# Patient Record
Sex: MALE | Race: WHITE
[De-identification: names, ages, dates, MRNs, and addresses within clinical notes are randomized per-mention and may not be internally consistent; named-entity substitution may affect disease eponyms.]

---

## 2022-06-06 ENCOUNTER — HOSPITAL ENCOUNTER (EMERGENCY)
Dept: HOSPITAL 97 - ER | Age: 37
Discharge: HOME | End: 2022-06-06
Payer: SELF-PAY

## 2022-06-06 VITALS — OXYGEN SATURATION: 99 % | TEMPERATURE: 98 F

## 2022-06-06 VITALS — DIASTOLIC BLOOD PRESSURE: 67 MMHG | SYSTOLIC BLOOD PRESSURE: 121 MMHG

## 2022-06-06 DIAGNOSIS — J18.9: Primary | ICD-10-CM

## 2022-06-06 DIAGNOSIS — R09.1: ICD-10-CM

## 2022-06-06 DIAGNOSIS — F17.210: ICD-10-CM

## 2022-06-06 LAB
BUN BLD-MCNC: 8 MG/DL (ref 7–18)
GLUCOSE SERPLBLD-MCNC: 154 MG/DL (ref 74–106)
HCT VFR BLD CALC: 41 % (ref 39.6–49)
LYMPHOCYTES # SPEC AUTO: 1.8 K/UL (ref 0.7–4.9)
PMV BLD: 7.3 FL (ref 7.6–11.3)
POTASSIUM SERPL-SCNC: 3.9 MMOL/L (ref 3.5–5.1)
RBC # BLD: 4.43 M/UL (ref 4.33–5.43)
TROPONIN I SERPL HS-MCNC: 3.4 PG/ML (ref ?–58.9)

## 2022-06-06 PROCEDURE — 85379 FIBRIN DEGRADATION QUANT: CPT

## 2022-06-06 PROCEDURE — 85025 COMPLETE CBC W/AUTO DIFF WBC: CPT

## 2022-06-06 PROCEDURE — 99284 EMERGENCY DEPT VISIT MOD MDM: CPT

## 2022-06-06 PROCEDURE — 80048 BASIC METABOLIC PNL TOTAL CA: CPT

## 2022-06-06 PROCEDURE — 71046 X-RAY EXAM CHEST 2 VIEWS: CPT

## 2022-06-06 PROCEDURE — 84484 ASSAY OF TROPONIN QUANT: CPT

## 2022-06-06 PROCEDURE — 93005 ELECTROCARDIOGRAM TRACING: CPT

## 2022-06-06 PROCEDURE — 36415 COLL VENOUS BLD VENIPUNCTURE: CPT

## 2022-06-06 NOTE — EDPHYS
Physician Documentation                                                                           

 [facility]                                                                 

Name: Gian Chowdhury                                                                               

Age: 36 yrs                                                                                       

Sex: Male                                                                                         

: 1985                                                                                   

MRN: [id number]                                                                                   

Arrival Date: 2022                                                                          

Time: 15:18                                                                                       

Account#: [id number]                                                                            

Bed 11                                                                                            

Private MD:                                                                                       

ED Physician Harjinder Agustin                                                                         

HPI:                                                                                              

                                                                                             

15:41 This 36 yrs old Male presents to ER via Ambulatory with complaints of Chest Pain.       rn  

15:41 The patient or guardian reports chest pain that is located primarily in the anterior    rn  

      chest wall, left. The pain does not radiate. Associated signs and symptoms: Pertinent       

      positives: cough, Pertinent negatives: abdominal pain, diaphoresis, dizziness,              

      shortness of breath, syncope, vomiting. The chest pain is described as sharp, stabbing.     

      Duration: The patient or guardian reports multiple episodes, that are intermittent.         

      Modifying factors: The symptoms are alleviated by nothing. the symptoms are aggravated      

      by cough, deep breath. Severity of pain: At its worst the pain was moderate in the          

      emergency department the pain has improved. The patient has not experienced similar         

      symptoms in the past. The patient has not recently seen a physician. Pt reports chest       

      pain, left sided, non-radiating, began 2 days ago, intermittent, no trauma. Reports         

      worse with deep breath and cough. No hemoptysis. + smoker. No hx of dvt. No recent          

      surgery or procedure. .                                                                     

                                                                                                  

Historical:                                                                                       

- Allergies:                                                                                      

15:34 No Known Allergies;                                                                     ww  

- Home Meds:                                                                                      

15:34 suboxone [Active];                                                                      ww  

- PSHx:                                                                                           

15:34 testicle;                                                                               ww  

                                                                                                  

- Immunization history:: Adult Immunizations not up to date.                                      

- Social history:: Smoking status: Patient reports the use of cigarette tobacco                   

  products, smokes one pack cigarettes per day.                                                   

- Family history:: not pertinent.                                                                 

- Hospitalizations: : No recent hospitalization is reported.                                      

                                                                                                  

                                                                                                  

ROS:                                                                                              

15:41 Constitutional: Negative for fever, chills, and weight loss, Eyes: Negative for injury, rn  

      pain, redness, and discharge, Neck: Negative for injury, pain, and swelling,                

      Cardiovascular: + chest pain Respiratory: + cough, + pleuritic chest pain Abdomen/GI:       

      Negative for abdominal pain MS/Extremity: Negative for injury and deformity, Skin:          

      Negative for injury, rash, and discoloration, Neuro: Negative for headache, weakness,       

      numbness, tingling, and seizure.                                                            

                                                                                                  

Exam:                                                                                             

15:41 Constitutional:  This is a well developed, well nourished patient who is awake, alert,  rn  

      and in no acute distress. Smiling and joking. Head/Face:  Normocephalic, atraumatic.        

      Cardiovascular:  Tachycardic, regular Respiratory:  No increased work of breathing, no      

      retractions or nasal flaring. Abdomen/GI:  Soft, non-tender Skin:  Warm, dry MS/            

      Extremity:  Pulses equal, no cyanosis.   Neuro:  Awake and alert, GCS 15                    

                                                                                                  

Vital Signs:                                                                                      

15:33  / 86; Pulse 128; Resp 22; Temp 98.0; Pulse Ox 99% ; Weight 79.38 kg; Height 5    ww  

      ft. 10 in. (177.80 cm);                                                                     

16:13  / 67; Pulse 105; Resp 16; Pulse Ox 99% on R/A;                                   iw  

15:33 Body Mass Index 25.11 (79.38 kg, 177.80 cm)                                             ww  

                                                                                                  

MDM:                                                                                              

15:22 Patient medically screened.                                                             rn  

16:06 ED course: Pt reports smoking and drinking monsters prior to arrival. .                 rn  

16:32 Differential diagnosis: acute pericarditis, anxiety, chest wall pain, costochondritis,  rn  

      pleurisy, pneumonia, pneumothorax, pulmonary embolus. Data reviewed: vital signs,           

      nurses notes, lab test result(s), EKG, radiologic studies, plain films, and as a            

      result, I will discharge patient. Counseling: I had a detailed discussion with the          

      patient and/or guardian regarding: the historical points, exam findings, and any            

      diagnostic results supporting the discharge/admit diagnosis, lab results, radiology         

      results, the need for outpatient follow up, to return to the emergency department if        

      symptoms worsen or persist or if there are any questions or concerns that arise at          

      home. Special discussion: I discussed with the patient/guardian in detail that at this      

      point there is no indication for admission to the hospital. It is understood, however,      

      that if the symptoms persist or worsen the patient needs to return immediately for          

      re-evaluation. ED course: CXR shows early pneumonia, no oxygen requirement, will dc         

      home with return precautions. Pt also admits to smoking crack earlier today prior to        

      coming in. Recommend cessation of crack..                                                   

                                                                                                  

                                                                                             

15:40 Order name: CBC with Diff; Complete Time: 16:15                                         rn  

                                                                                             

15:40 Order name: Basic Metabolic Panel; Complete Time: 17:00                                 rn  

                                                                                             

15:40 Order name: XRAY Chest Pa And Lat (2 Views); Complete Time: 16:25                       rn  

                                                                                             

15:40 Order name: EKG; Complete Time: 15:41                                                   rn  

                                                                                             

15:40 Order name: D-Dimer; Complete Time: 16:15                                               rn  

                                                                                             

15:40 Order name: Troponin High Sensitivity; Complete Time: 17:00                             rn  

                                                                                             

15:40 Order name: IV Start; Complete Time: 15:47                                              rn  

                                                                                             

15:40 Order name: EKG - Nurse/Tech; Complete Time: 15:47                                      rn  

                                                                                             

15:40 Order name: Cardiac monitoring; Complete Time: 16:50                                    rn  

                                                                                             

15:40 Order name: O2 Sat Monitoring; Complete Time: 16:08                                     rn  

                                                                                                  

Administered Medications:                                                                         

No medications were administered                                                                  

                                                                                                  

                                                                                                  

Disposition Summary:                                                                              

22 16:34                                                                                    

Discharge Ordered                                                                                 

      Location: Home                                                                          rn  

      Problem: new                                                                            rn  

      Symptoms: have improved                                                                 rn  

      Condition: Stable                                                                       rn  

      Diagnosis                                                                                   

        - Pneumonia, unspecified organism                                                     rn  

        - Pleurisy                                                                            rn  

      Followup:                                                                               rn  

        - With: Private Physician                                                                  

        - When: As needed                                                                          

        - Reason: Recheck today's complaints, Re-evaluation by your physician                      

      Discharge Instructions:                                                                     

        - Discharge Summary Sheet                                                             rn  

        - Pleurisy                                                                            rn  

        - Community-Acquired Pneumonia, Adult                                                 rn  

      Forms:                                                                                      

        - Medication Reconciliation Form                                                      rn  

        - Thank You Letter                                                                    rn  

        - Antibiotic Education                                                                rn  

        - Prescription Opioid Use                                                             rn  

      Prescriptions:                                                                              

        - levofloxacin 500 mg Oral Tablet                                                          

            - take 1 tablet by ORAL route once daily for 7 days; 7 tablet; Refills: 0,        rn  

      Product Selection Permitted                                                                 

Signatures:                                                                                       

Dispatcher [employer]                           Harjinder Mejia MD MD rn Wood, Whitney, RN RN ww                                                   

                                                                                                  

**************************************************************************************************

## 2022-06-06 NOTE — XMS REPORT
Continuity of Care Document

                             Created on:2022



Patient:KENNY SALGADO

Sex:Male

:1985

External Reference #:[id number]





Demographics







                          Address                   [address] 



                                                    [place] [address]

 

                          Home Phone                [phone]

 

                          Work Phone                [phone]

 

                          Email Address             [email or web address]

 

                          Preferred Language        Unknown

 

                          Marital Status            Unknown

 

                          Cheondoism Affiliation     Unknown

 

                          Race                      Unknown

 

                          Additional Race(s)        Unavailable

 

                          Ethnic Group              Unknown









Author







                          Organization              [facility]

t

 

                          Address                   [address]



                                                    [place] [address]

 

                          Phone                     [phone]









Care Team Providers







                    Name                Role                Phone

 

                    Unavailable         Unavailable         Unavailable









Problems

This patient has no known problems.



Allergies, Adverse Reactions, Alerts

This patient has no known allergies or adverse reactions.



Medications

This patient has no known medications.



Procedures

This patient has no known procedures.



Results







           Test Description Test Time  Test Comments Results    Result Comments 

Source









                    BUPRENORPHINE AND METABOLITE 2022 15:34:27 









                      Test Item  Value      Reference Range Interpretation Comme

nts









             NORBUPRENORPHINE (test code = 6.6 ng/mL                            

  



             30704)                                              

 

             BUPRENORPHINE (test code = 71674) 1.6 ng/mL                        

      INTERPRETIVE INFORMATION:



                                                                 Buprenorphine a

nd Metabolites,



                                                                                

    Serum or Plasma,



                                                                 QuantitativeMet

hodology: Quantitative



                                                                 Liquid Chromato

graphy-Tandem Mass



                                                                 SpectrometryPos

itive cutoff: 1



                                                                 ng/mLFor medica

l purposes only; not



                                                                 valid for foren

sic use.The presence



                                                                 of metabolite(s

) without parent drug



                                                                 is common and m

ay indicate use of



                                                                 parent drug dur

ing the prior week.



                                                                 The absence of 

expected drug(s)



                                                                 and/or drug met

abolite(s) may



                                                                 indicate non-co

mpliance,



                                                                 inappropriate t

iming of specimen



                                                                 collection rela

tive to drug



                                                                 administration,

 poor drug absorption,



                                                                 or limitations 

of testing. The



                                                                 concentration v

alue must be greater



                                                                 than or equal t

o the cutoff to be



                                                                 reported as pos

itive. Interpretive



                                                                 questions shoul

d be directed to the



                                                                 laboratory.This

 test was developed



                                                                 and its perform

ance characteristics



                                                                 determined by A

[facility]. It



                                                                 has not been cl

eared or approved by



                                                                 the [place] Food and

 Drug Administration.



                                                                 This test was p

erformed in a CLIA



                                                                 certified labor

atory and is intended



                                                                 for clinical pu

rposes.  TESTING



                                                                 PERFORMED AT [facility]

OLOGISTS, [facility]



                                                                   [address]    [place], [place]  [address]    

            CAP NO.



                                                                 [id number]  CLIA 

NO. [id number]



                                                                  UNLESS OTHERWI

SE INDICATED, ALL



                                                                 TESTING PERFORM

ED ATCLINICAL



                                                                 PATHOLOGY [facility], located within [facility], [facility].  [address]



                                                                 [facility] [place]

[place] [address]



                                                                 LABORATORY DIRE

CTOR:  JAYDEN WILLOUGHBY M.D.

      CLIA NUMBER



                                                                 [id number]  CAP

 ACCREDITATION NO.



                                                                 [id number]



DRUG SCREEN, SERUM, NO CONFIRMATION2022-04-15 07:48:10





             Test Item    Value        Reference    Interpretation Comments



                                       Range                     

 

             AMPHETAMINES (test Negative                               



             code = 69697)                                        

 

             BARBITURATES (test Negative                               



             code = 09173)                                        

 

             BENZODIAZEPINES Positive                               



             (test code = 17374)                                        

 

             COCAINE METABOLITE Negative                               



             (test code = 78233)                                        

 

             METHADONE (test Negative                               



             code = 78391)                                        

 

             OPIATES (test code Negative                               



             = 942236)                                           

 

             PHENCYCLIDINE (test Negative                               



             code = 891663)                                        

 

             PROPOXYPHENE (test Negative                               



             code = 335031)                                        

 

             THC (CANNABIS) Negative                               



             (test code =                                        



             605560)                                             

 

             ETHANOL (test code Negative                                        

         Screen



             = 751215)                                           Decision Limits

         Drug



                                                                 Analyzed       

Screen



                                                                 Units        --

-----------



                                                                    ------      

 -----



                                                                 Amphetamine    

       500



                                                                    ng/mL       

 Barbiturate



                                                                         150    

    ng/mL



                                                                   Benzodiazepin

es       100



                                                                      ng/mL     

   Cocaine



                                                                           150  

      ng/mL



                                                                     Ethanol    

           10



                                                                        mg/dL   

      Toxic



                                                                 Blood Ethanol >

300



                                                                 mg/dL        Me

thadone



                                                                      150       

 ng/mL



                                                                 Opiates        

       150



                                                                    ng/mL       

 Phencyclidine



                                                                         12     

    ng/mL



                                                                   Propoxyphene 

         150



                                                                      ng/mL     

   THC



                                                                 (Cannabis)     

   50



                                                                 ng/mL A positiv

e immunoassay



                                                                 result on a ser

um drug screen



                                                                 isconsidered pr

esumptive



                                                                 evidence for th

e presence of



                                                                 thedrug or its 

metabolite.



                                                                 Since some immu

noassay



                                                                 testsdetect onl

y inactive



                                                                 metabolites and

 others are



                                                                 sensitiveto lars

y low drug



                                                                 levels, positiv

e results may



                                                                 not correlatewi

th the



                                                                 patient's physi

ological



                                                                 state. For conf

irmation of



                                                                 positive immuno

assay results



                                                                 by analternate 

method or for



                                                                 consultation,  

please contact



                                                                 thelaboratory. 

  If



                                                                 applicable, any

 drug



                                                                 confirmation te

sting



                                                                 reportedhere wa

s developed



                                                                 and the perform

ance



                                                                 characteristics

determined by



                                                                 [facility]

aboratory.



                                                                 This confirmati

on testing has



                                                                 not been cleare

d or



                                                                 approvedby the 

FDA. The



                                                                 laboratory is r

egulated under



                                                                 CLIA asqualifie

d to perform



                                                                 high-complexity

 testing. This



                                                                 testis used for

 patient



                                                                 testing purpose

s. It should



                                                                 not beregarded 

as



                                                                 investigational

 or for



                                                                 research.



HIV 1/2 4TH GEN, RFLX [id number] 06:04:17





             Test Item    Value        Reference Range Interpretation Comments

 

             HIV 1/2 4TH GEN, RFLX CONF (test NON-REACTIVE NON-REACTIVE         

     



             code = 3514)                                        



HEPATITIS PANEL, [id number] 06:04:17





             Test Item    Value        Reference Range Interpretation Comments

 

             HEPATITIS A IgM (test NON-REACTIVE NON-REACTIVE              



             code = 20310)                                        

 

             HEPATITIS B CORE IgM NON-REACTIVE NON-REACTIVE              



             (test code = 4644)                                        

 

             HEPATITIS B SURF AG NON-REACTIVE NON-REACTIVE              



             (test code = 2739)                                        

 

             HEPATITIS C ANTIBODY NON-REACTIVE NON-REACTIVE              



             (test code = 4675)                                        

 

             INTERPRETATION (NOTE)                                      Hepatiti

s A



             HEPATITIS A: (test                                        serology 

shows no



             code = 2552)                                        evidence of acu

te



                                                                 hepatitis A.

 

             INTERPRETATION (NOTE)                                      Hepatiti

s B



             HEPATITIS B: (test                                        serology 

shows no



             code = 91115)                                        evidence of ac

Ione



                                                                 hepatitis B and

no



                                                                 indication of



                                                                 exposure to



                                                                 hepatitis B vir

us in



                                                                 the previous si

xto



                                                                 eight months.

 

             INTERPRETATION (NOTE)                                      Hepatiti

s C



             HEPATITIS C: (test                                        serology 

shows no



             code = 15130)                                        evidence of ex

posure



                                                                 to hepatitisC v

irus



                                                                 at this time.  

It



                                                                 can take up to 

12



                                                                 months after



                                                                 exposure tothe



                                                                 hepatitis C vir

us



                                                                 for antibodies 

to



                                                                 become detectab

le in



                                                                 the     blood i

n



                                                                 certain patient

s.



                                                                     UNLESS OTHE

RWISE



                                                                 INDICATED, ALL



                                                                 TESTING PERFORM

ED



                                                                 ATCLINICAL PATH

OLOGY



                                                                 LABORATORIES, [place]

[place].



                                                                 [address] [place] [address]

4



                                                                      LABORATORY



                                                                 DIRECTOR:  JAYDEN WILLOUGHBY M.D.



                                                                 CLIA NUMBER



                                                                 [id number]  CAP



                                                                 ACCREDITATION N

O.



                                                                 [id number]

## 2022-06-06 NOTE — RAD REPORT
EXAM DESCRIPTION:  RAD - Chest Pa And Lat (2 Views) - 6/6/2022 4:13 pm

 

CLINICAL HISTORY:  left sided, smoker

 

COMPARISON:  None

 

TECHNIQUE:  Frontal and lateral views of the chest were obtained.

 

FINDINGS:  The lungs are normal volume. Left lung field clear of focal infiltrate. Left-sided interst
itial markings are not outside of normal range.  Patchy opacification is present in the mid right alessandro
g field with an area of nodularity present as well. In the acute clinical setting early right midlung
 field pneumonia would be most likely. The provided history indicated left-sided chest pain. This nee
ds correlation. Aggressive mass lesion of the lung would not be expected in a patient this age. Heart
 size is normal and central vasculature is within normal limits.  No pleural effusion or pneumothorax
 seen.  No acute bony finding noted.  No aortic abnormality.

 

IMPRESSION:  Patchy right mid lung field parenchymal opacification is present. In the acute clinical 
setting early pneumonia would be suspected.

 

Clinical correlation is needed with history and exam findings. The history provided with this examina
tion was left-sided chest pain.

## 2022-06-06 NOTE — ER
Nurse's Notes                                                                                     

 [facility]                                                                 

Name: Gian Chowdhury                                                                               

Age: 36 yrs                                                                                       

Sex: Male                                                                                         

: 1985                                                                                   

MRN: [id number]                                                                                   

Arrival Date: 2022                                                                          

Time: 15:18                                                                                       

Account#: [id number]                                                                            

Bed 11                                                                                            

Private MD:                                                                                       

Diagnosis: Pneumonia, unspecified organism;Pleurisy                                               

                                                                                                  

Presentation:                                                                                     

                                                                                             

15:33 Chief complaint: Patient states: Left side chest pain that started on Saturday that     ww  

      gets worse with coughing and taking a deep breath. Coronavirus screen: Client denies        

      travel out of the [place] in the last 14 days. Ebola Screen: Patient denies travel to an       

      Ebola-affected area in the 21 days before illness onset. Initial Sepsis Screen: Does        

      the patient meet any 2 criteria? No. Patient's initial sepsis screen is negative. Does      

      the patient have a suspected source of infection? No. Patient's initial sepsis screen       

      is negative. Risk Assessment: Do you want to hurt yourself or someone else? Patient         

      reports no desire to harm self or others. Onset of symptoms was 2022.              

15:33 Method Of Arrival: Ambulatory                                                           ww  

15:33 Acuity: TYESHA 3                                                                           ww  

                                                                                                  

Triage Assessment:                                                                                

15:34 General: Appears in no apparent distress. Behavior is cooperative. Pain: Complains of   ww  

      pain in anterior aspect of left upper chest and left breast. Neuro: Level of                

      Consciousness is awake, alert, obeys commands, Oriented to person, place, time,             

      situation, Moves all extremities. Gait is steady, Speech is normal. Cardiovascular:         

      Capillary refill < 3 seconds Patient's skin is warm and dry. Respiratory: Airway is         

      patent Respiratory effort is even, unlabored, Respiratory pattern is regular,               

      symmetrical. GI: No signs and/or symptoms were reported involving the gastrointestinal      

      system. : No signs and/or symptoms were reported regarding the genitourinary system.      

                                                                                                  

Historical:                                                                                       

- Allergies:                                                                                      

15:34 No Known Allergies;                                                                     ww  

- Home Meds:                                                                                      

15:34 suboxone [Active];                                                                      ww  

- PSHx:                                                                                           

15:34 testicle;                                                                               ww  

                                                                                                  

- Immunization history:: Adult Immunizations not up to date.                                      

- Social history:: Smoking status: Patient reports the use of cigarette tobacco                   

  products, smokes one pack cigarettes per day.                                                   

- Family history:: not pertinent.                                                                 

- Hospitalizations: : No recent hospitalization is reported.                                      

                                                                                                  

                                                                                                  

Screening:                                                                                        

15:36 Abuse screen: Denies threats or abuse. Denies injuries from another. Nutritional        ww  

      screening: No deficits noted. Tuberculosis screening: No symptoms or risk factors           

      identified. Fall Risk None identified.                                                      

                                                                                                  

Assessment:                                                                                       

16:13 General: Appears in no apparent distress. Behavior is calm, cooperative. Pain:          iw  

      Complains of pain in anterior aspect of left upper chest Pain does not radiate. Pain        

      began 1 day ago. Neuro: Level of Consciousness is awake, alert, obeys commands.             

                                                                                                  

Vital Signs:                                                                                      

15:33  / 86; Pulse 128; Resp 22; Temp 98.0; Pulse Ox 99% ; Weight 79.38 kg; Height 5    ww  

      ft. 10 in. (177.80 cm);                                                                     

16:13  / 67; Pulse 105; Resp 16; Pulse Ox 99% on R/A;                                   iw  

15:33 Body Mass Index 25.11 (79.38 kg, 177.80 cm)                                             ww  

                                                                                                  

ED Course:                                                                                        

15:18 Patient arrived in ED.                                                                  as  

15:22 Harjinder Agustin MD is Attending Physician.                                                rn  

15:34 Triage completed.                                                                       ww  

15:34 Arm band placed on.                                                                     ww  

15:36 EKG done.                                                                               ww  

16:08 Jessika Collins, RN is Primary Nurse.                                                   iw  

16:14 XRAY Chest Pa And Lat (2 Views) In Process Unspecified.                                 EDMS

16:14 Patient maintains SpO2 saturation greater than 95% on room air.                         iw  

                                                                                                  

Administered Medications:                                                                         

No medications were administered                                                                  

                                                                                                  

                                                                                                  

Outcome:                                                                                          

16:34 Discharge ordered by MD.                                                                rn  

17:05 Patient left the ED.                                                                    iw  

                                                                                                  

Signatures:                                                                                       

Dispatcher [employer]                           EDMS                                                 

Elizabeth Dimas                             as                                                   

Jessika Collins, RN                     ENRIQUE                                                      

Harjinder Agustin MD MD rn Wood, Whitney, RN RN ww                                                   

                                                                                                  

**************************************************************************************************

## 2022-06-07 NOTE — EKG
Test Date:    2022-06-06               Test Time:    15:28:10

Technician:   DEVYN                                    

                                                     

MEASUREMENT RESULTS:                                       

Intervals:                                           

Rate:         126                                    

ND:           100                                    

QRSD:         94                                     

QT:           304                                    

QTc:          440                                    

Axis:                                                

P:            72                                     

ND:           100                                    

QRS:          73                                     

T:            -55                                    

                                                     

INTERPRETIVE STATEMENTS:                                       

                                                     

Sinus tachycardia with short ND

ST & T wave abnormality, consider inferior ischemia

Abnormal ECG

No previous ECG available for comparison



Electronically Signed On 06-07-22 09:49:48 CDT by Ramon Johnson